# Patient Record
Sex: FEMALE | Race: WHITE | NOT HISPANIC OR LATINO | Employment: FULL TIME | URBAN - METROPOLITAN AREA
[De-identification: names, ages, dates, MRNs, and addresses within clinical notes are randomized per-mention and may not be internally consistent; named-entity substitution may affect disease eponyms.]

---

## 2023-07-18 ENCOUNTER — TELEPHONE (OUTPATIENT)
Dept: TRANSPLANT | Facility: CLINIC | Age: 43
End: 2023-07-18

## 2023-07-18 NOTE — TELEPHONE ENCOUNTER
Completed BREEZE questionnaire reviewed and independent living donor advocate assessment completed. Spoke with patient to arrange preliminary crossmatch. HLA kit requested, asked patient to contact us once received to review instructions. All questions were answered and patient verbalized understanding.

## 2023-07-25 PROCEDURE — 81376 HLA II TYPING 1 LOCUS LR: CPT | Mod: PO,TXP | Performed by: NURSE PRACTITIONER

## 2023-07-25 PROCEDURE — 81373 HLA I TYPING 1 LOCUS LR: CPT | Mod: 59,PO,TXP | Performed by: NURSE PRACTITIONER

## 2023-07-25 PROCEDURE — 81376 HLA II TYPING 1 LOCUS LR: CPT | Mod: 59,PO,TXP | Performed by: NURSE PRACTITIONER

## 2023-07-25 PROCEDURE — 81373 HLA I TYPING 1 LOCUS LR: CPT | Mod: PO,TXP | Performed by: NURSE PRACTITIONER

## 2023-07-27 ENCOUNTER — TELEPHONE (OUTPATIENT)
Dept: TRANSPLANT | Facility: CLINIC | Age: 43
End: 2023-07-27
Payer: COMMERCIAL

## 2023-07-27 ENCOUNTER — LAB VISIT (OUTPATIENT)
Dept: LAB | Facility: HOSPITAL | Age: 43
End: 2023-07-27
Payer: COMMERCIAL

## 2023-07-27 DIAGNOSIS — Z00.5 TRANSPLANT DONOR EVALUATION: Primary | ICD-10-CM

## 2023-07-27 DIAGNOSIS — Z00.5 TRANSPLANT DONOR EVALUATION: ICD-10-CM

## 2023-07-28 LAB
ABO GROUP BLD: NORMAL
RH BLD: NORMAL

## 2023-07-28 PROCEDURE — 86900 BLOOD TYPING SEROLOGIC ABO: CPT | Mod: TXP | Performed by: NURSE PRACTITIONER

## 2023-07-28 PROCEDURE — 36415 COLL VENOUS BLD VENIPUNCTURE: CPT | Mod: TXP | Performed by: NURSE PRACTITIONER

## 2023-07-31 ENCOUNTER — TELEPHONE (OUTPATIENT)
Dept: TRANSPLANT | Facility: CLINIC | Age: 43
End: 2023-07-31
Payer: COMMERCIAL

## 2023-07-31 DIAGNOSIS — Z00.5 TRANSPLANT DONOR EVALUATION: Primary | ICD-10-CM

## 2023-07-31 NOTE — TELEPHONE ENCOUNTER
Patient notified that the results of the crossmatch with her half sister show that they are compatible. Patient states she would like to proceed with the medical evaluation. Required testing was discussed including infectious disease and HIV testing. Opportunity provided for questions. Informed that she will be contacted to schedule appointments. All questions were answered and patient verbalized understanding.

## 2023-08-14 LAB — HLATY INTERPRETATION: NORMAL

## 2023-08-22 LAB
HLA DQA1 1: NORMAL
HLA DQA1 2: NORMAL
HLA DRB4 1: NORMAL
HLA-A 1 SERO. EQUIV: 1
HLA-A 1: NORMAL
HLA-A 2 SERO. EQUIV: 2
HLA-A 2: NORMAL
HLA-B 1 SERO. EQUIV: 7
HLA-B 1: NORMAL
HLA-B 2 SERO. EQUIV: 8
HLA-B 2: NORMAL
HLA-BW 1 SERO. EQUIV: 6
HLA-BW 2 SERO. EQUIV: NORMAL
HLA-C 1: NORMAL
HLA-C 2: NORMAL
HLA-CW 1 SERO. EQUIV: 7
HLA-CW 2 SERO. EQUIV: NORMAL
HLA-DPA1 1: NORMAL
HLA-DPA1 2: NORMAL
HLA-DPB1 1: NORMAL
HLA-DPB1 2: NORMAL
HLA-DQ 1 SERO. EQUIV: 6
HLA-DQ 2 SERO. EQUIV: NORMAL
HLA-DQB1 1: NORMAL
HLA-DQB1 2: NORMAL
HLA-DRB1 1 SERO. EQUIV: 13
HLA-DRB1 1: NORMAL
HLA-DRB1 2 SERO. EQUIV: 15
HLA-DRB1 2: NORMAL
HLA-DRB3 1: NORMAL
HLA-DRB3 2: NORMAL
HLA-DRB345 1 SERO. EQUIV: 52
HLA-DRB345 2 SERO. EQUIV: 51
HLA-DRB4 2: NORMAL
HLA-DRB5 1: NORMAL
HLA-DRB5 2: NORMAL
RTPCR TESTING DATE: NORMAL

## 2023-09-08 ENCOUNTER — TELEPHONE (OUTPATIENT)
Dept: TRANSPLANT | Facility: CLINIC | Age: 43
End: 2023-09-08
Payer: COMMERCIAL

## 2023-09-08 NOTE — TELEPHONE ENCOUNTER
Spoke with donor, let her know she will bring the sample when she comes to see us on 9/19.  Let her know I would call her next Friday to go over test instructions.  Also asked how she was doing on quitting smoking.  She said she had reduced, but not fully quit.  I let her know that she would be required to give up smoking 2 months before surgery if she is approved.  She said that wouldn't be a problem.  All questions answered.    ----- Message from Jenna Lara sent at 9/8/2023  4:07 PM CDT -----  Regarding: Consult/Advisory  Contact: Olivia Eugene  Consult/Advisory    Name Of Caller: Olivia Eugene       Contact Preference: [phone] (Mobile)     Nature of call: Patient calling to see if she needs to take 24hr urine sample today to [facility]. Requesting a call back.

## 2023-09-15 ENCOUNTER — TELEPHONE (OUTPATIENT)
Dept: TRANSPLANT | Facility: CLINIC | Age: 43
End: 2023-09-15
Payer: COMMERCIAL

## 2023-09-15 NOTE — TELEPHONE ENCOUNTER
Confirmed donor appointments for Tuesday 9/19/23.  24 hour urine collection instructions reviewed.  All questions answered.

## 2023-09-19 ENCOUNTER — HOSPITAL ENCOUNTER (OUTPATIENT)
Dept: CARDIOLOGY | Facility: HOSPITAL | Age: 43
Discharge: HOME | End: 2023-09-19
Attending: NURSE PRACTITIONER
Payer: COMMERCIAL

## 2023-09-19 ENCOUNTER — HOSPITAL ENCOUNTER (OUTPATIENT)
Dept: RADIOLOGY | Facility: HOSPITAL | Age: 43
Discharge: HOME | End: 2023-09-19
Attending: NURSE PRACTITIONER
Payer: COMMERCIAL

## 2023-09-19 ENCOUNTER — OFFICE VISIT (OUTPATIENT)
Dept: TRANSPLANT | Facility: CLINIC | Age: 43
End: 2023-09-19
Payer: COMMERCIAL

## 2023-09-19 VITALS — BODY MASS INDEX: 25.07 KG/M2 | WEIGHT: 156 LBS | HEIGHT: 66 IN

## 2023-09-19 VITALS
TEMPERATURE: 98 F | RESPIRATION RATE: 16 BRPM | WEIGHT: 157.44 LBS | SYSTOLIC BLOOD PRESSURE: 134 MMHG | OXYGEN SATURATION: 100 % | BODY MASS INDEX: 26.23 KG/M2 | DIASTOLIC BLOOD PRESSURE: 88 MMHG | HEART RATE: 63 BPM | HEIGHT: 65 IN

## 2023-09-19 DIAGNOSIS — Z00.5 TRANSPLANT DONOR EVALUATION: ICD-10-CM

## 2023-09-19 DIAGNOSIS — Z00.5 WILLING TO BE KIDNEY DONOR: Primary | ICD-10-CM

## 2023-09-19 LAB
ASCENDING AORTA: 2.57 CM
AV INDEX (PROSTH): 0.77
AV MEAN GRADIENT: 5 MMHG
AV PEAK GRADIENT: 11 MMHG
AV VALVE AREA BY VELOCITY RATIO: 2.43 CM²
AV VALVE AREA: 2.63 CM²
AV VELOCITY RATIO: 0.71
BSA FOR ECHO PROCEDURE: 1.82 M2
CV ECHO LV RWT: 0.36 CM
CV STRESS BASE HR: 55 BPM
DIASTOLIC BLOOD PRESSURE: 82 MMHG
DOP CALC AO PEAK VEL: 1.64 M/S
DOP CALC AO VTI: 31.15 CM
DOP CALC LVOT AREA: 3.4 CM2
DOP CALC LVOT DIAMETER: 2.09 CM
DOP CALC LVOT PEAK VEL: 1.16 M/S
DOP CALC LVOT STROKE VOLUME: 82.05 CM3
DOP CALCLVOT PEAK VEL VTI: 23.93 CM
E WAVE DECELERATION TIME: 214.34 MSEC
E/A RATIO: 2.02
E/E' RATIO: 7.07 M/S
ECHO LV POSTERIOR WALL: 0.82 CM (ref 0.6–1.1)
FRACTIONAL SHORTENING: 33 % (ref 28–44)
INTERVENTRICULAR SEPTUM: 0.92 CM (ref 0.6–1.1)
IVRT: 88.49 MSEC
LA MAJOR: 5.77 CM
LA MINOR: 5.8 CM
LA WIDTH: 3.9 CM
LEFT ATRIUM SIZE: 3.55 CM
LEFT ATRIUM VOLUME INDEX: 37.8 ML/M2
LEFT ATRIUM VOLUME: 68.08 CM3
LEFT INTERNAL DIMENSION IN SYSTOLE: 3.06 CM (ref 2.1–4)
LEFT VENTRICLE DIASTOLIC VOLUME INDEX: 53.14 ML/M2
LEFT VENTRICLE DIASTOLIC VOLUME: 95.66 ML
LEFT VENTRICLE MASS INDEX: 72 G/M2
LEFT VENTRICLE SYSTOLIC VOLUME INDEX: 20.4 ML/M2
LEFT VENTRICLE SYSTOLIC VOLUME: 36.66 ML
LEFT VENTRICULAR INTERNAL DIMENSION IN DIASTOLE: 4.57 CM (ref 3.5–6)
LEFT VENTRICULAR MASS: 130.22 G
LV LATERAL E/E' RATIO: 7.62 M/S
LV SEPTAL E/E' RATIO: 6.6 M/S
MV A" WAVE DURATION": 8.28 MSEC
MV PEAK A VEL: 0.49 M/S
MV PEAK E VEL: 0.99 M/S
MV STENOSIS PRESSURE HALF TIME: 62.16 MS
MV VALVE AREA P 1/2 METHOD: 3.54 CM2
OHS CV CPX 1 MINUTE RECOVERY HEART RATE: 88 BPM
OHS CV CPX 85 PERCENT MAX PREDICTED HEART RATE MALE: 143
OHS CV CPX ESTIMATED METS: 9
OHS CV CPX MAX PREDICTED HEART RATE: 168
OHS CV CPX PATIENT IS FEMALE: 1
OHS CV CPX PATIENT IS MALE: 0
OHS CV CPX PEAK DIASTOLIC BLOOD PRESSURE: 87 MMHG
OHS CV CPX PEAK HEAR RATE: 134 BPM
OHS CV CPX PEAK RATE PRESSURE PRODUCT: NORMAL
OHS CV CPX PEAK SYSTOLIC BLOOD PRESSURE: 140 MMHG
OHS CV CPX PERCENT MAX PREDICTED HEART RATE ACHIEVED: 80
OHS CV CPX RATE PRESSURE PRODUCT PRESENTING: NORMAL
PISA TR MAX VEL: 2.76 M/S
POST STRESS EJECTION FRACTION: 75 %
PULM VEIN S/D RATIO: 1.38
PV PEAK D VEL: 0.37 M/S
PV PEAK S VEL: 0.51 M/S
RA MAJOR: 4.79 CM
RA PRESSURE ESTIMATED: 3 MMHG
RA WIDTH: 3.77 CM
RIGHT VENTRICULAR END-DIASTOLIC DIMENSION: 3.8 CM
RV TB RVSP: 6 MMHG
SINUS: 2.72 CM
STJ: 2.19 CM
STRESS ECHO POST EXERCISE DUR MIN: 5 MINUTES
STRESS ECHO POST EXERCISE DUR SEC: 44 SECONDS
SYSTOLIC BLOOD PRESSURE: 206 MMHG
TDI LATERAL: 0.13 M/S
TDI SEPTAL: 0.15 M/S
TDI: 0.14 M/S
TR MAX PG: 30 MMHG
TRICUSPID ANNULAR PLANE SYSTOLIC EXCURSION: 2.78 CM
TV REST PULMONARY ARTERY PRESSURE: 33 MMHG
Z-SCORE OF LEFT VENTRICULAR DIMENSION IN END DIASTOLE: -0.85
Z-SCORE OF LEFT VENTRICULAR DIMENSION IN END SYSTOLE: -0.04

## 2023-09-19 PROCEDURE — 3079F PR MOST RECENT DIASTOLIC BLOOD PRESSURE 80-89 MM HG: ICD-10-PCS | Mod: CPTII,S$GLB,TXP, | Performed by: TRANSPLANT SURGERY

## 2023-09-19 PROCEDURE — 93325 STRESS ECHO (CUPID ONLY): ICD-10-PCS | Mod: 26,TXP,, | Performed by: INTERNAL MEDICINE

## 2023-09-19 PROCEDURE — 93325 DOPPLER ECHO COLOR FLOW MAPG: CPT | Mod: 26,TXP,, | Performed by: INTERNAL MEDICINE

## 2023-09-19 PROCEDURE — 3075F PR MOST RECENT SYSTOLIC BLOOD PRESS GE 130-139MM HG: ICD-10-PCS | Mod: CPTII,S$GLB,TXP, | Performed by: TRANSPLANT SURGERY

## 2023-09-19 PROCEDURE — 71046 X-RAY EXAM CHEST 2 VIEWS: CPT | Mod: 26,TXP,, | Performed by: RADIOLOGY

## 2023-09-19 PROCEDURE — 99205 PR OFFICE/OUTPT VISIT, NEW, LEVL V, 60-74 MIN: ICD-10-PCS | Mod: S$GLB,TXP,, | Performed by: TRANSPLANT SURGERY

## 2023-09-19 PROCEDURE — 71046 X-RAY EXAM CHEST 2 VIEWS: CPT | Mod: TC,FY,TXP

## 2023-09-19 PROCEDURE — 3044F PR MOST RECENT HEMOGLOBIN A1C LEVEL <7.0%: ICD-10-PCS | Mod: CPTII,S$GLB,TXP, | Performed by: TRANSPLANT SURGERY

## 2023-09-19 PROCEDURE — 99999 PR PBB SHADOW E&M-EST. PATIENT-LVL IV: ICD-10-PCS | Mod: PBBFAC,TXP,,

## 2023-09-19 PROCEDURE — 71046 XR CHEST PA AND LATERAL: ICD-10-PCS | Mod: 26,TXP,, | Performed by: RADIOLOGY

## 2023-09-19 PROCEDURE — 3044F HG A1C LEVEL LT 7.0%: CPT | Mod: CPTII,S$GLB,TXP, | Performed by: TRANSPLANT SURGERY

## 2023-09-19 PROCEDURE — 3075F SYST BP GE 130 - 139MM HG: CPT | Mod: CPTII,S$GLB,TXP, | Performed by: TRANSPLANT SURGERY

## 2023-09-19 PROCEDURE — 93351 STRESS TTE COMPLETE: CPT | Mod: TXP

## 2023-09-19 PROCEDURE — 97802 MEDICAL NUTRITION INDIV IN: CPT | Mod: S$GLB,TXP,,

## 2023-09-19 PROCEDURE — 93320 DOPPLER ECHO COMPLETE: CPT | Mod: 26,TXP,, | Performed by: INTERNAL MEDICINE

## 2023-09-19 PROCEDURE — 93351 STRESS TTE COMPLETE: CPT | Mod: 26,TXP,, | Performed by: INTERNAL MEDICINE

## 2023-09-19 PROCEDURE — 99999 PR PBB SHADOW E&M-EST. PATIENT-LVL IV: CPT | Mod: PBBFAC,TXP,,

## 2023-09-19 PROCEDURE — 3008F BODY MASS INDEX DOCD: CPT | Mod: CPTII,S$GLB,TXP, | Performed by: TRANSPLANT SURGERY

## 2023-09-19 PROCEDURE — 3008F PR BODY MASS INDEX (BMI) DOCUMENTED: ICD-10-PCS | Mod: CPTII,S$GLB,TXP, | Performed by: TRANSPLANT SURGERY

## 2023-09-19 PROCEDURE — 3079F DIAST BP 80-89 MM HG: CPT | Mod: CPTII,S$GLB,TXP, | Performed by: TRANSPLANT SURGERY

## 2023-09-19 PROCEDURE — 97802 PR MED NUTR THER, 1ST, INDIV, EA 15 MIN: ICD-10-PCS | Mod: S$GLB,TXP,,

## 2023-09-19 PROCEDURE — 99205 OFFICE O/P NEW HI 60 MIN: CPT | Mod: S$GLB,TXP,, | Performed by: TRANSPLANT SURGERY

## 2023-09-19 PROCEDURE — 93320 STRESS ECHO (CUPID ONLY): ICD-10-PCS | Mod: 26,TXP,, | Performed by: INTERNAL MEDICINE

## 2023-09-19 PROCEDURE — 93351 STRESS ECHO (CUPID ONLY): ICD-10-PCS | Mod: 26,TXP,, | Performed by: INTERNAL MEDICINE

## 2023-09-19 NOTE — PROGRESS NOTES
"DONOR TEACHING NOTE    Met with Olivia Eugene today in clinic to review living donor education.        Topics covered included:  Kidney donation is done voluntarily and of the donor's free will.  Process can stop at any time.   Better success rates than cadaveric donation, shorter waiting time for the recipient: less than the 3-5 year wait on the list, more time to prepare: tests/surgery can be planned  Laboratory studies of blood and urine.  Health exams: records of GYN/pap/mammogram (females); evaluation by transplant team and a psychiatrist (if indicated); diagnostic Tests: CXR, EKG, TB skin test, 24-hour urine collection, renal scan, CT of abdomen to assess kidney anatomy, and stress test (if indicated)  Team will review workup for approval.  Copy of the approved criteria for donation given to patient.  Surgeon will decide which kidney will be donated.   Benefits of laparoscopic nephrectomy: less pain, shorter hospital stay, a shorter recovery period.  Risks discussed: bleeding, deep vein thrombosis, pulmonary embolus, the need for re-operation.  Your operation may be converted to an "open" procedure if the surgeon feels it is medically necessary.  To recovery room, transfer to TSU, if space allows or semi-private room.  Berrios catheter/IV, average hospital stay is 1 day.  At discharge the cost of any prescriptions is the donor's responsibility.  Post-operative visit 4 weeks after surgery or prior to returning to work, unless a complication arises and you need to be seen sooner.  Off of work for about 3 to 6 weeks, no driving for at least the first 3 weeks.  General surgical complications: infection, allergic reaction, and anesthesia.   Long life considerations of living with one kidney: risk of HTN and kidney failure   Following up with PCP 6 months after donation then yearly thereafter to monitor kidney function.  This should include weight, labs, urinalysis, and a blood pressure check.  We are required to " report your progress to UNOS at 6 months, 1 year, and 2 years post-donation.     Written educational material provided. Informed consent reviewed and signed. All questions were answered and patient verbalized understanding.     Patient is a suitable candidate for living kidney donation.

## 2023-09-19 NOTE — PROGRESS NOTES
Transplant Surgery Kidney Donor Evaluation     Referring Physician:     Subjective:     Chief Complaint: Olivia Eugene is a 42 y.o. year old female who presents today wishing to be evaluated as a potential living related donor for her half sister Shelia.    History of Present Illness:  Olivia reports being here without coercion, payment, guilt, or other alternative motives other than wanting to help someone with kidney disease.    External provider notes reviewed: Yes    Review of Systems   Constitutional:  Negative for activity change, appetite change, chills and fever.   HENT:  Negative for congestion, nosebleeds, sinus pressure, sore throat and voice change.    Eyes:  Negative for pain and visual disturbance.   Respiratory:  Negative for cough and shortness of breath.    Cardiovascular:  Negative for palpitations and leg swelling.   Gastrointestinal:  Negative for abdominal distention, abdominal pain, diarrhea, nausea and vomiting.   Endocrine: Negative for cold intolerance and heat intolerance.   Genitourinary:  Negative for dysuria, flank pain, frequency and hematuria.   Musculoskeletal:  Negative for back pain and gait problem.   Skin:  Negative for color change, pallor and wound.   Allergic/Immunologic: Negative for food allergies.   Neurological:  Negative for dizziness, seizures, numbness and headaches.   Hematological:  Negative for adenopathy.   Psychiatric/Behavioral:  Negative for agitation, behavioral problems, hallucinations and sleep disturbance.    Objective:   Physical Exam  Constitutional:       Appearance: She is well-developed.   HENT:      Head: Normocephalic and atraumatic.   Eyes:      Pupils: Pupils are equal, round, and reactive to light.   Cardiovascular:      Rate and Rhythm: Normal rate and regular rhythm.   Pulmonary:      Effort: Pulmonary effort is normal.   Abdominal:      General: There is no distension.      Palpations: Abdomen is soft.      Tenderness: There is no abdominal  tenderness. There is no guarding.      Comments: Scar from tubal and appy   Musculoskeletal:         General: Normal range of motion.   Skin:     General: Skin is warm and dry.   Neurological:      Mental Status: She is alert and oriented to person, place, and time.   Psychiatric:         Behavior: Behavior normal.     ABO type: B POS    Diagnostics:  The following labs have been reviewed: CBC  CMP  INR  The following radiology images have been independently reviewed and interpreted: CT Abd/Pelvis    Diagnoses:  No diagnosis found.         Transplant Surgery - Candidacy   Assessment/Plan:     Donor Candidacy: Based on information available thus far, Olivia is a suitable candidate for living kidney donation.    Additional testing to be completed according to Written Order Guideline for Living Kidney Donor (LD) Evaluation (LDK-02).    Patient advised that it is recommended that all transplant candidates, and their close contacts and household members receive Covid vaccination.    Interpretation of tests and discussion of patient management involves all members of the multidisciplinary transplant team.  Ventura Anne Jr, MD       Education: I discussed with the patient the requirements for donation including the compatibility of blood and tissue typing, healthy by physical examination and workup, as well as the desire to donate.  We discussed the risks related to surgery including the risks related to anesthesia, bleeding, infection, inability for surgery to be performed laparoscopically, risks of reoperation as well as the risks of death.  We discussed the length of hospitalization, return to work times, as well as follow-up post-donation.    I also discussed the slight possibility that due to problems with the recipient operation, the transplant might not be able to be completed after the organ was already removed. If such a situation should arise, the donor prefers that the organ be transplanted into a suitable  third-party recipient.    I discussed the possibility that living donor sometimes encounter problems obtaining health insurance or could have higher premium despite ongoing efforts of transplant professionals to educate insurance companies on this issue.    I discussed with Olivia that donation is a voluntary activity and reiterated it should be done willingly and for altruistic reasons only.  I reviewed that no payment should be made for donating.  I also discussed that coercion, guilt, pressure, or feelings of obligation are not appropriate reasons to donate.  The option to withdraw at any time was emphasized.  Olivia was reminded that a medical opt out can be given to protect her confidentiality, and no member of the transplant team will discuss specifics of her health or medical/social history with anyone else without permission.  The need for lifelong routine medical follow-up for optimal health, including routine health maintenance was reviewed.    Additionally, I discussed the need for our program to be able to contact living donors for UNOS reporting purposes for a minimum of 2 years.  Failure of our center to be able to provide such information could jeopardize our ability to continue to offer living donor transplants.  Olivia voices understanding and agrees to this follow-up.    I discussed the UNOS requirement for centers to report donor status for a minimum of two years. Olivia understands that failure to comply with requirement could have adverse consequences for our transplant program and agrees to cooperate with all our required follow-up.    I reviewed with Olivia available lab results and other diagnostics from the evaluation process.    Coronavirus disease (COVID-19) caused by severe acute respiratory virus coronavirus 2 (SARS-C0V 2) is associated with increased mortality in solid organ transplant recipients (SOT) compared to non-transplant patients. Vaccine responses to vaccination are  depressed against SARS-CoV2 compared to normal individuals but improve with third vaccination doses. Vaccination prior to SOT provides both the best opportunity for transplant candidates to develop protective immunity and to reduce the risk of serious COVID19 infections post transplantation. Organ transplant candidates at [facility] will be required to receive SARS-CoV-2 vaccination prior to being listed with a an active status, whenever possible. Exceptions will be made for disability related reasons or for sincerely held Worship beliefs.

## 2023-09-19 NOTE — PROGRESS NOTES
REASON FOR VISIT:   Potential kidney donor; elevated cholesterol     PAST MEDICAL HX:   No significant medical history     LABS:   Cholesterol 271    WT: 71.4 kg (157 lb)   BMI: 26.04 kg/m2       NUTRITION HX:   Pt present. Pt reports great appetite with no N/V/D/C. Significant intentional wt loss through exercise (4 miles walking/running, pushups, arm lifts). Pt reports consuming foods high in fat including fried foods, hamburgers, steaks, and high fat dairy products. Pt consumes takeout/restaurant meals 3-4x/wk. Pt does not have a PCP.      INTERVENTION/EDUCATION:  Reviewed Fat Restricted Nutrition Therapy handout (general information, foods recommended, foods not recommended, sample menus).    Encouraged physical activity daily, regular exercise as tolerated, stay mobile.    Patient voiced understanding of education & goals. Contact information was provided & will f/u as needed at clinic visits.     Consultation Time: 15 minutes

## 2023-09-19 NOTE — PROGRESS NOTES
Kidney Transplant Donor Evaluation    Subjective:       CC:  Initial evaluation of kidney donor candidacy.    HPI:  Ms. Eugene is a 42 y.o. year old White female who has presented to be evaluated as a potential living related donor for her half sister who has history of FSGS and failing kidney allograft.  Ms. Eugene reports being here without coercion, payment, guilt or other alternative motives other than wanting to help someone with kidney disease.    Patient denies any history of coronary artery disease, stroke, seizure disorder, chronic obstructive pulmonary disease, liver disease, kidney stones, gallstones, deep venous thrombosis, pulmonary embolism, recurrent urinary tract infections or malignancies.    Patient was fullterm at birth. Denies medical issue. Current smoker aware she needs to quit prior to surgery. On advil for back pain is aware cannot use after donation. She is active she exercise (runs, lifts weight). 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     Body mass index is 26.04 kg/m².    History reviewed. No pertinent past medical history.    Past Surgical History:   Procedure Laterality Date    ADENOIDECTOMY      LUMBAR FUSION      L4-5; S1-2    TUBAL LIGATION      WISDOM TOOTH EXTRACTION         Family History   Problem Relation Age of Onset    Kidney disease Sister        Social History     Tobacco Use    Smoking status: Every Day     Current packs/day: 1.00     Average packs/day: 1 pack/day for 26.0 years (26.0 ttl pk-yrs)     Types: Cigarettes     Start date: 9/19/1997    Smokeless tobacco: Never   Substance Use Topics    Alcohol use: Never    Drug use: Never       No current outpatient medications on file.      Review of Systems   Constitutional:  Negative for appetite change, chills, fatigue and fever.   HENT:  Negative for trouble swallowing.    Respiratory:  Negative for cough, chest tightness, shortness of breath and wheezing.    Cardiovascular:  Negative for chest pain,  "palpitations and leg swelling.   Gastrointestinal:  Negative for abdominal pain, constipation, diarrhea and nausea.   Genitourinary:  Negative for difficulty urinating, frequency and urgency.   Musculoskeletal:  Negative for arthralgias and myalgias.   Skin:  Negative for rash.   Neurological:  Negative for dizziness, weakness, light-headedness and headaches.   Psychiatric/Behavioral:  Negative for sleep disturbance.        Objective:  /86 (BP Location: Right arm, Patient Position: Sitting, BP Method: Medium (Automatic))   Pulse 64   Temp 97.7 °F (36.5 °C) (Tympanic)   Resp 18   Ht 5' 5.2" (1.656 m)   Wt 71.4 kg (157 lb 6.5 oz)   SpO2 100%   BMI 26.04 kg/m²      Physical Exam  Constitutional:       General: She is not in acute distress.     Appearance: She is well-developed. She is not diaphoretic.   Cardiovascular:      Rate and Rhythm: Normal rate and regular rhythm.      Heart sounds: Normal heart sounds.   Pulmonary:      Effort: Pulmonary effort is normal.      Breath sounds: Normal breath sounds.   Abdominal:      General: Bowel sounds are normal.      Palpations: Abdomen is soft.   Musculoskeletal:         General: No tenderness. Normal range of motion.   Skin:     General: Skin is warm and dry.      Findings: No rash.      Nails: There is no clubbing.   Neurological:      Mental Status: She is alert and oriented to person, place, and time.   Psychiatric:         Behavior: Behavior normal.         Labs:  9/19/2023: Creatinine 0.7 mg/dL (Ref range: 0.5 - 1.4 mg/dL); Creatinine, Urine 67.0 mg/dL (Ref range: 15.0 - 325.0 mg/dL); Creatinine, Urine 164.0 mg/dL (Ref range: 15.0 - 325.0 mg/dL); BUN 17 mg/dL (Ref range: 6 - 20 mg/dL)  9/19/2023: Nitrite, UA Negative (Ref range: Negative)  ABO type: B POS    Assessment:     1. Willing to be kidney donor        Plan:   Aware of no NSAID use after dotation  Stop smoking 6 weeks before surgery    UA + for RBC, WBC and protein 175---stopped mentrual cycle 2 " days ago  Cr clearance 67    Urine studies needs to be repeated     Needs home BP monitoring     Donor Candidacy:   Based on the given information, Ms. Eugene appears to be a suitable candidate for kidney donation.  A final recommendation will be made by the selection committee after reviewing her complete workup.    Rima Patel NP       Counseling:   I discussed with Ms. Eugene that donation is voluntary and reiterated it should be done willingly and for altruistic reasons only.  I reviewed that no payment should be received for donating.  I also discussed that coercion, guilt, pressure, or feelings of obligation are not appropriate reasons to donate.  The option to withdraw at any time was emphasized.  Ms. Eugene was reminded that a medical opt out can be given to protect her confidentiality, and no member of the transplant team will discuss specifics of her health or medical/social history with anyone else without permission.  The need for lifelong routine medical follow-up for optimal health, including routine health maintenance was reviewed.    We also discussed the long term risks associated with kidney donation.  I told the patient that her GFR should return to within 75-80% of pre-donation level within six months of donation.  I informed the patient that there is a small risk of developing albuminuria and hypertension following donor nephrectomy.  I also informed the patient that based on current literature, the risk of developing end-stage renal disease following donor nephrectomy is similar to the general population.    Patient advised that it is recommended that all transplant candidates, and their close contacts and household members receive Covid vaccination.    I reviewed with Ms. Eugene available lab results and other diagnostics from the evaluation process    Additional Counseling:   The patient was counseled on the need for regular follow-up with a primary care physician for blood pressure and  cholesterol screening.  The importance of age appropriate health screening was also emphasized.    Follow-up:   prn    Altogether, 30 minutes of this encounter were spent on counseling, which was greater than 50% of the total visit time.

## 2023-09-19 NOTE — PROGRESS NOTES
PHARM.D. PRE-TRANSPLANT DONOR NOTE:    This patient's medication therapy was evaluated as part of her pre-transplant evaluation.    The following pharmacologic concerns were noted: none       No current outpatient medications on file.     No current facility-administered medications for this visit.         I am available for consultation and can be contacted, as needed by the other members of the Kidney Transplant team.

## 2023-09-21 ENCOUNTER — TELEPHONE (OUTPATIENT)
Dept: TRANSPLANT | Facility: CLINIC | Age: 43
End: 2023-09-21
Payer: COMMERCIAL

## 2023-09-21 DIAGNOSIS — Z00.5 TRANSPLANT DONOR EVALUATION: Primary | ICD-10-CM

## 2023-09-21 NOTE — TELEPHONE ENCOUNTER
Let patient know that her Hepatitis C antibody was positive and that we would need to do an ultrasound and have a hepatology appointment.  All questions answered.

## 2023-09-21 NOTE — TELEPHONE ENCOUNTER
Spoke with patient, let her know we need to repeat her 24 hour urine testing, urinalysis, and schedule another CT here at main campus.  All questions answered.    ----- Message from Jenna Lara sent at 9/21/2023  9:21 AM CDT -----  Regarding: Returning a Missed Call  Contact: Olivia Eugene  Returning a Missed Call    Caller: Olivia Eugene       Returning call to: Trang      Caller can be reached @: [phone] (Mobile)    Nature of the call: Patient returning call to Trang. Patient missed call seconds before calling back and is requesting a call back.

## 2023-09-22 ENCOUNTER — TELEPHONE (OUTPATIENT)
Dept: TRANSPLANT | Facility: CLINIC | Age: 43
End: 2023-09-22
Payer: COMMERCIAL

## 2023-09-22 NOTE — TELEPHONE ENCOUNTER
Spoke with donor, asked that she go to [facility] to complete Hepatitis blood testing.  All questions answered.  Order emailed to patient.

## 2023-09-25 NOTE — PROGRESS NOTES
TRANSPLANT DONOR PSYCHOSOCIAL ASSESSMENT    SW completed donor assessment with patient and pt's father Kenan Eugene in clinic.    Olivia Eugene  [address] [place] [address]  Telephone Information:   Mobile [phone]     Home There is no home phone number on file.  Work  There is no work phone number on file.  E-mail  [email or web address]    PHS Increased Risk Behavioral Questionnaire reviewed by : Yes   addressed any PHS increased risk behaviors or concerns. Resources and education provided as appropriate.    Sex: female  YOB: 1980  Age: 42 y.o.    Encounter Date: 2023  [place] Citizen: yes  Primary Language: English   Needed: no    Potential Recipient: Shelia Polo  Clinic Number: [id number]  Donor's Relationship to Patient:  half-sister    Emergency Contact:  Name: Kenan Eugene  Relationship: father  Address: [place], [place]  Phone Numbers: [phone] (mobile)    Family/Social Support:   Number of dependents/: none  Marital history: Pt is  and denies current significant other.  Other family dynamics: Pt's mother is .  Pt's father is alive, well, and will serve as pt's primary caregiver for transplant donation.  Pt has 5 adult children with whom pt is in contact.  Pt reports one of her daughters can likely serve as a secondary caregiver if needed.    Household Composition:  Patient lives alone.    Do you and your caregivers have access to reliable transportation? yes  PRIMARY CAREGIVER: Kenan Eugene, pt's father, will be primary caregiver, phone number [phone].      provided in-depth information to patient and caregiver regarding pre- and post-transplant caregiver role.   strongly encourages patient and caregiver to have concrete plan regarding post-transplant care giving, including back-up caregiver(s) to ensure care giving needs are met as needed.    Patient and Caregiver states  understanding all aspects of caregiver role/commitment and is able/willing/committed to being caregiver to the fullest extent necessary.    Patient and Caregiver verbalizes understanding of the education provided today and caregiver responsibilities.         remains available. Patient and Caregiver agree to contact  in a timely manner if concerns arise.      Able to take time off work without financial concerns: yes.     Additional Significant Others who will Assist with Transplant:  Patient denies having additionally identified caregivers at this time; however, pt states one of her daughters will likely be available to provide secondary caregiver support if needed.    Living Will: no  Healthcare Power of : no  Advance Directives on file: <no information> per medical record.  Verbally reviewed LW/HCPA information.   provided patient with copy of LW/HCPA documents and provided education on completion of forms.    Education:  finished 10th grade and earned GED and completed 2 years of college  Reading Ability: college  Reports difficulty with: N/A  Learns [employer]:  hands-on learning     Status: no  VA Benefits: no     Employment:  Patient reports currently working as a self-employed .  Fundraising and [employer] information provided to patient.  Patient and Caregiver verbalizes understanding.   remains available.    Spouse/Significant Other Employment: N/A    Insurance: see potential recipient's insurance for donor coverage.    Does the donor have health insurance? No - Pt denies concerns regarding ability to afford prescribed medications related to transplant donation.    Patient and Caregiver verbalizes clear understanding that patient may experience difficulty obtaining and/or be denied insurance coverages post-surgery.  This includes and is not limited to disability insurance, life insurance, health insurance, burial insurance, long  term care insurance, and other insurances.  Patient also reports understanding that future health concerns related to or unrelated to transplantation may not be covered by patient's insurance.  Resources and information provided and reviewed.    Patient and Caregiver provides verbal permission to release any necessary information to outside resources for patient care and discharge planning.  Resources and information provided and reviewed.      Infusion Service: patient utilizing? no  Home Health: patient utilizing? no  DME: no  ADLS:  Pt reports being independent with all ADLs and mobility and driving herself.    Adherence:   Pt/caregiver reports pt has exhibited good adherence to medication regimen, appointment schedule, and medical recommendations in the past.   Adherence education and counseling provided    Per History Section:  History reviewed. No pertinent past medical history.  Social History     Tobacco Use    Smoking status: Every Day     Current packs/day: 1.00     Average packs/day: 1 pack/day for 26.0 years (26.0 ttl pk-yrs)     Types: Cigarettes     Start date: 9/19/1997    Smokeless tobacco: Never   Substance Use Topics    Alcohol use: Never     Social History     Substance and Sexual Activity   Drug Use Never     Social History     Substance and Sexual Activity   Sexual Activity Yes    Partners: Male     Per this SW's request, pt's father / caregiver exited exam room at this point in interview to allow 1:1 conversation with pt.    Per Today's Psychosocial:  Tobacco:  Patient reports currently smoking .  Pt reports currently smoking 0.5 to 0.75 ppd and has been smoking since age 12.  Pt reports being able to quit for 1 year in the past.  However, pt reports overeating during this time and weighing approximately 300 lbs.  Pt verbalized understanding of need to discontinue cigarette smoking for the purpose of transplant donation and expresses commitment to doing so.  Alcohol: none, patient denies any  "use.  Pt reports last ETOH was 1 beer 2 weeks ago.  Pt denies history of ETOH abuse and states plans of maintaining abstinence moving forward for the purpose of transplant donation.  Illicit Drugs/Non-prescribed Medications: none, patient denies any use.  Patient reports history of prescription opioid abuse.  Pt reports currently being "1,847 days sober" since last abuse of opioids.  Pt denies ever engaging in substance abuse treatment and/or peer recovery support groups.  Pt denies history of any other illicit drugs.    Patient and Caregiver states clear understanding of the potential impact of substance use as it relates to donor candidacy and is agreeable to random substance screening.  Substance abstinence/cessation counseling, education and resources provided and reviewed.     Arrests/DWI/Treatment/Rehab: patient denies    Psychiatric History:  Mental Health: Pt denies any past and/or present mental health symptoms and/or diagnoses.  Psychiatrist/Counselor: Patient reports having seen a psychiatrist at [place] once for evaluation "to prove everyone wrong because people kept telling me I was crazy."  Pt denies any other encounters with mental health providers.  Medications:  Pt denies ever being prescribed medications for mental health care.  Suicide/Homicide Issues: Pt denies any past and/or present SI/HI.   Safety at home: Pt denies having any past or present concerns regarding safety at home including but not limited to physical/emotional/sexual abuse, neglect, or exploitation.    Donation Knowledge/Expectations: Patient and Caregiver states having clear understanding and realistic expectations regarding the potential risks and potential benefits of organ transplantation and organ donation and agrees to discuss with health care team members and support system members, as well as to utilize available resources and express questions and/or concerns in order to further facilitate the patients informed " decision-making.  Resources and information provided and reviewed.    Decision-making Process: Patient reports her half-sister (potential recipient) approached her and multiple other family members with information regarding how to start the transplant donation process.  Patient reports having initiated completion of the rest of the process once it was determined that she was a match with potential recipient.  Patient states understanding that transplant and donation are not a guarantee that the donated organ will function. Patient states understanding of kidney treatment options available for kidney patients, psychosocial aspects surrounding organ donation and transplantation as well as recovery.  Patient also states clear understanding that patient may choose to not donate at any time prior to surgery taking place, and that patient confidentiality is protected.  Patient reports expected compliance with health care regime and states understanding of importance of compliance.  Educational information provided.    Patient reports having a clear understanding  that risks and benefits may be involved with organ transplantation and with organ donation and  of the treatment options available to a potential transplant recipient. Patient has an understanding there are short and long-term medical and psychosocial risks of living donation for both the donor and recipient. Patient reports clear understanding that psychosocial risk factors which may affect patient, including but not limited to feelings of depression, generalized anxiety, anxiety regarding dependence on others, post traumatic stress disorder, feelings of guilt and other emotional and/or mental concerns, and/or exacerbation of existing mental health concerns.     Detailed resources and education provided and discussed. Patient agrees to access appropriate resources in a timely manner as needed and to communicate concerns appropriately.      Feelings or Concerns:   Patient denies feelings of coercion, pressure or obligation to donate. Patient states that patient is not receiving any compensation for organ donation. Patient reports motivation to pursue organ donation at this time.     Patient reports having clear and realistic expectations and understanding of the many psychosocial aspects involved with being a living organ donor, including but not limited to costs, compliance, medications, lab work, procedures, appointments, financial planning, preparedness, timely and appropriate communication of concerns, abstinence from non-prescribed drugs or substances, importance of adherence to and follow-through with all health care team recommendations, participation in health care and treatment planning, utilization of resources and follow-through, mental health counseling as needed and/or recommended, and the patient and caregiver responsibilities.  Patient states having a clear understanding of possible difficulty obtaining or possibility of being denied insurance coverage post-surgery.  This includes and is not limited to disability insurance, life insurance, health insurance, burial insurance, long-term care insurance and other insurances.  Educational and resource information provided and reviewed.  Patient also reports understanding that future health concerns related to or unrelated to organ donation may not be covered by patients insurance.    Coping: Identify Patient & Caregiver Strategies to [place]:   1. In the past, coping with major surgery and/or related stress - practicing gratitude, reading her Bible, staying task oriented   2. Currently & Pre-transplant - same as above   3. At the time of organ donation surgery - same as above   4. During post-Organ donation & Recovery Period - same as above    Interview Behavior: Olivia presents as alert and oriented x 4, pleasant, good eye contact, well groomed, recall good, concentration/judgement good, average intelligence, calm,  communicative, cooperative, and asking and answering questions appropriately.  Pt accompanied to clinic visit by father Kenan who presents as highly supportive and attentive.    Suitability for Donation: Olivia Eugene presents as  low to moderate risk  candidate for donation at this time due to history of prescription opioid abuse.    Recommendations/Additional Comments: SW recommends drug screening prior to transplant donation surgery.  SW recommends that pt conduct fundraising to assist pt with pay for cost of medications, food, gas, and other transplant related needs.  SW recommends that pt remain aware of potential mental health concerns and contact the team if any concerns arise.  SW recommends that pt remain abstinent from tobacco, ETOH, and drug use.  SW supports pt's continued adherence. SW remains available to answer any questions or concerns that arise as the pt moves through the transplant donation process.

## 2023-09-26 ENCOUNTER — TELEPHONE (OUTPATIENT)
Dept: TRANSPLANT | Facility: CLINIC | Age: 43
End: 2023-09-26
Payer: COMMERCIAL

## 2023-09-26 NOTE — TELEPHONE ENCOUNTER
Spoke with patient about difficulties with lab draw.  She was unable to open the attachment with order inside.  Sent order via mail to patient.  All questions answered.    ----- Message from Michelle Lacy RN sent at 9/25/2023  3:46 PM CDT -----  Regarding: FW: Patient advice  Contact: Pt [phone]    ----- Message -----  From: Radha Dalton  Sent: 9/25/2023  12:45 PM CDT  To: [facility] Donor Coordinator  Subject: Patient advice                                             Name of Caller:  Olivia     Contact Preference:     [phone]    Nature of Call:   Requesting a call back would like alterative location where labs can be drawn lab [facility] is closed

## 2023-10-03 ENCOUNTER — TELEPHONE (OUTPATIENT)
Dept: HEPATOLOGY | Facility: CLINIC | Age: 43
End: 2023-10-03
Payer: COMMERCIAL

## 2023-10-03 NOTE — TELEPHONE ENCOUNTER
Patient was a no-show for scheduled appt with PA Scheuermann on 10/2/23.  Letter sent asking that she contact hepatology for rescheduling.

## 2023-10-16 ENCOUNTER — TELEPHONE (OUTPATIENT)
Dept: TRANSPLANT | Facility: CLINIC | Age: 43
End: 2023-10-16
Payer: COMMERCIAL

## 2023-10-16 NOTE — TELEPHONE ENCOUNTER
Left message for donor, attempted to contact regarding additional testing that is needed to see about donor candidacy.  Emailed patient as well.